# Patient Record
Sex: FEMALE | Race: WHITE | Employment: FULL TIME | URBAN - METROPOLITAN AREA
[De-identification: names, ages, dates, MRNs, and addresses within clinical notes are randomized per-mention and may not be internally consistent; named-entity substitution may affect disease eponyms.]

---

## 2019-11-12 ENCOUNTER — OFFICE VISIT (OUTPATIENT)
Dept: INTERNAL MEDICINE | Facility: CLINIC | Age: 49
End: 2019-11-12

## 2019-11-12 VITALS
BODY MASS INDEX: 31.49 KG/M2 | OXYGEN SATURATION: 98 % | WEIGHT: 189 LBS | RESPIRATION RATE: 18 BRPM | TEMPERATURE: 98.2 F | SYSTOLIC BLOOD PRESSURE: 108 MMHG | HEART RATE: 84 BPM | DIASTOLIC BLOOD PRESSURE: 80 MMHG | HEIGHT: 65 IN

## 2019-11-12 DIAGNOSIS — Z53.9 ERRONEOUS ENCOUNTER--DISREGARD: Primary | ICD-10-CM

## 2019-11-12 RX ORDER — TRAMADOL HYDROCHLORIDE 50 MG/1
50 TABLET ORAL
COMMUNITY
Start: 2018-03-09

## 2019-11-12 RX ORDER — CYCLOBENZAPRINE HCL 5 MG
5 TABLET ORAL
COMMUNITY
Start: 2018-03-14

## 2019-11-12 ASSESSMENT — MIFFLIN-ST. JEOR: SCORE: 1475.24

## 2019-11-12 NOTE — PROGRESS NOTES
Subjective     Anastasia Raya is a 49 year old female who presents to clinic today for the following health issues:    HPI   Back Pain       Duration:         Specific cause: work-related    Description:   Location of pain: low back midde  Character of pain: sharp  Pain radiation:across the lower back  New numbness or weakness in legs, not attributed to pain:  no     Intensity: mild    History:   Pain interferes with job: No  History of back problems: no prior back problems  Any previous MRI or X-rays: None  Sees a specialist for back pain:  No  Therapies tried without relief: rest    Alleviating factors:   Improved by: massage      Precipitating factors:  Worsened by: Lifting  Patient not having any back pain today   She wants to talk about vitamin D deficiency and transferring care/establishing care with a new PCP in [place]       Patient also concerned about Vitamin D deficiency. She has had this problem in the past. Having joint swelling, heart flutters, and fatigue.     -------------------------------------    Discussed with patient that I am a same day or acute care provider for the [facility] group. I could order labs but she would need to follow up with at PCP and review results and any treatment that is needed.   Patient very upset. Had scheduled at [facility] but was told the wrong time last week. Initially for back pain- this was then rescheduled with my clinic today with appt for back pain .   She left the visit and walked out of clinic.         NO CHARGE VISIT